# Patient Record
Sex: FEMALE | Race: WHITE | NOT HISPANIC OR LATINO | URBAN - METROPOLITAN AREA
[De-identification: names, ages, dates, MRNs, and addresses within clinical notes are randomized per-mention and may not be internally consistent; named-entity substitution may affect disease eponyms.]

---

## 2017-10-11 ENCOUNTER — OFFICE (OUTPATIENT)
Dept: URBAN - METROPOLITAN AREA CLINIC 33 | Facility: CLINIC | Age: 72
End: 2017-10-11
Payer: OTHER GOVERNMENT

## 2017-10-11 VITALS
SYSTOLIC BLOOD PRESSURE: 135 MMHG | DIASTOLIC BLOOD PRESSURE: 91 MMHG | HEIGHT: 61 IN | TEMPERATURE: 97.7 F | WEIGHT: 138 LBS | HEART RATE: 81 BPM

## 2017-10-11 DIAGNOSIS — Z86.010 PERSONAL HISTORY OF COLONIC POLYPS: ICD-10-CM

## 2017-10-11 DIAGNOSIS — R14.0 ABDOMINAL DISTENSION (GASEOUS): ICD-10-CM

## 2017-10-11 DIAGNOSIS — R14.1 GAS PAIN: ICD-10-CM

## 2017-10-11 DIAGNOSIS — K21.9 GASTRO-ESOPHAGEAL REFLUX DISEASE WITHOUT ESOPHAGITIS: ICD-10-CM

## 2017-10-11 PROCEDURE — 99204 OFFICE O/P NEW MOD 45 MIN: CPT

## 2017-11-28 ENCOUNTER — ON CAMPUS - OUTPATIENT (OUTPATIENT)
Dept: URBAN - METROPOLITAN AREA HOSPITAL 14 | Facility: HOSPITAL | Age: 72
End: 2017-11-28
Payer: MEDICARE

## 2017-11-28 DIAGNOSIS — Z86.010 PERSONAL HISTORY OF COLONIC POLYPS: ICD-10-CM

## 2017-11-28 PROCEDURE — G0105 COLORECTAL SCRN; HI RISK IND: HCPCS

## 2019-02-06 ENCOUNTER — OFFICE (OUTPATIENT)
Dept: URBAN - METROPOLITAN AREA CLINIC 33 | Facility: CLINIC | Age: 74
End: 2019-02-06
Payer: OTHER GOVERNMENT

## 2019-02-06 VITALS
HEART RATE: 69 BPM | SYSTOLIC BLOOD PRESSURE: 127 MMHG | DIASTOLIC BLOOD PRESSURE: 85 MMHG | TEMPERATURE: 97.9 F | HEIGHT: 61 IN | WEIGHT: 119 LBS

## 2019-02-06 DIAGNOSIS — K29.60 OTHER GASTRITIS WITHOUT BLEEDING: ICD-10-CM

## 2019-02-06 DIAGNOSIS — Z86.010 PERSONAL HISTORY OF COLONIC POLYPS: ICD-10-CM

## 2019-02-06 DIAGNOSIS — K21.9 GASTRO-ESOPHAGEAL REFLUX DISEASE WITHOUT ESOPHAGITIS: ICD-10-CM

## 2019-02-06 DIAGNOSIS — K76.89 OTHER SPECIFIED DISEASES OF LIVER: ICD-10-CM

## 2019-02-06 DIAGNOSIS — R10.13 EPIGASTRIC PAIN: ICD-10-CM

## 2019-02-06 PROCEDURE — 99214 OFFICE O/P EST MOD 30 MIN: CPT

## 2019-02-06 NOTE — SERVICEHPINOTES
JASON PEREZ   is a   73  female who complains of gastritis and liver cysts. She mentions onset of epigastric pain x 1 month ago, occurs mainly 2-3 hours s/p meals, and associated with mild nausea as well as heartburn (no vomiting). She was recently seen by PCP for recurrent gastritis symptoms (epigastic pain) who rx'ed Carafate TID being used for past month and advised to restart Famotidine BID which she has not done due to concern about the medication. She notes in the past using Prilosec that helped but stopped once she felt better. Last EGD in 12/2010 found gastritis with neg biopsies. She notes "night sweats" that occur few times per month going on for several months (advised to f/u with PCP).  Reviewed recent 02/02/2019 CT abdominal scan ordered by PCP for surveillance of known liver cysts that states multiple hepatic cysts unchanged compared to prior CT scans. She mentions  passed away June 2018 and feeling very stressed. Rare NSAID use. Denies n/v, regurgitation, reflux, dysphagia, odynophagia, diarrhea, constipation, melena, blood in stool, rectal bleeding, weight loss.     BR

## 2019-03-13 ENCOUNTER — ON CAMPUS - OUTPATIENT (OUTPATIENT)
Dept: URBAN - METROPOLITAN AREA HOSPITAL 14 | Facility: HOSPITAL | Age: 74
End: 2019-03-13
Payer: OTHER GOVERNMENT

## 2019-03-13 DIAGNOSIS — R10.84 GENERALIZED ABDOMINAL PAIN: ICD-10-CM

## 2019-03-13 DIAGNOSIS — K29.60 OTHER GASTRITIS WITHOUT BLEEDING: ICD-10-CM

## 2019-03-13 PROCEDURE — 43239 EGD BIOPSY SINGLE/MULTIPLE: CPT

## 2019-04-17 ENCOUNTER — OFFICE (OUTPATIENT)
Dept: URBAN - METROPOLITAN AREA CLINIC 33 | Facility: CLINIC | Age: 74
End: 2019-04-17
Payer: OTHER GOVERNMENT

## 2019-04-17 VITALS
WEIGHT: 123 LBS | TEMPERATURE: 97.9 F | SYSTOLIC BLOOD PRESSURE: 99 MMHG | HEIGHT: 61 IN | DIASTOLIC BLOOD PRESSURE: 65 MMHG | HEART RATE: 78 BPM

## 2019-04-17 DIAGNOSIS — K76.89 OTHER SPECIFIED DISEASES OF LIVER: ICD-10-CM

## 2019-04-17 DIAGNOSIS — K21.9 GASTRO-ESOPHAGEAL REFLUX DISEASE WITHOUT ESOPHAGITIS: ICD-10-CM

## 2019-04-17 DIAGNOSIS — Z86.010 PERSONAL HISTORY OF COLONIC POLYPS: ICD-10-CM

## 2019-04-17 PROCEDURE — 99214 OFFICE O/P EST MOD 30 MIN: CPT

## 2019-04-17 NOTE — SERVICEHPINOTES
JASON PEREZ   is a   73  female who is here for f/u visit concerning GERD. She has been taking Carafate less often recently due to going out and not always taking the rx with her, so she has been using it once daily or every other day and adding Pepcid (Famotidine) 20 mg prn. She has tried Prilosec in the past that helped but does not want to use it long term due to potential side effects as well as concern that she will become "dependent" on the PPI. She rather continuing with Pepcid (Famotidine) 20 mg and Tums prn that helps control her occasional reflux. She mentions using Pepcid qd x 6 months as advised by PCP that helped but when she stops it without tapering off, then she will have rebound reflux. She states when upper abdominal pains return she will take Carafate for a few weeks then stops it. Her recent EGD in 03/2019 found moderate chronic active gastritis bx neg h pylori. Her 01/2019 abdominal U/S ordered by PCP for surveillance of known liver cysts states multiple hepatic cysts unchanged compared to prior CT scans. She mentions  passed away June 2018 and feeling very stressed. Rare NSAID use. Denies n/v, regurgitation, reflux, dysphagia, odynophagia, diarrhea, constipation, melena, blood in stool, rectal bleeding, weight loss.BR

## 2020-01-22 ENCOUNTER — OFFICE (OUTPATIENT)
Dept: URBAN - METROPOLITAN AREA CLINIC 33 | Facility: CLINIC | Age: 75
End: 2020-01-22
Payer: OTHER GOVERNMENT

## 2020-01-22 VITALS
DIASTOLIC BLOOD PRESSURE: 84 MMHG | HEIGHT: 61 IN | WEIGHT: 122.4 LBS | SYSTOLIC BLOOD PRESSURE: 135 MMHG | HEART RATE: 70 BPM | TEMPERATURE: 97.5 F

## 2020-01-22 DIAGNOSIS — K59.09 OTHER CONSTIPATION: ICD-10-CM

## 2020-01-22 DIAGNOSIS — R14.1 GAS PAIN: ICD-10-CM

## 2020-01-22 DIAGNOSIS — K76.89 OTHER SPECIFIED DISEASES OF LIVER: ICD-10-CM

## 2020-01-22 DIAGNOSIS — K21.9 GASTRO-ESOPHAGEAL REFLUX DISEASE WITHOUT ESOPHAGITIS: ICD-10-CM

## 2020-01-22 DIAGNOSIS — Z86.010 PERSONAL HISTORY OF COLONIC POLYPS: ICD-10-CM

## 2020-01-22 PROCEDURE — 99214 OFFICE O/P EST MOD 30 MIN: CPT

## 2020-01-22 RX ORDER — OMEPRAZOLE AND SODIUM BICARBONATE 40; 1100 MG/1; MG/1
CAPSULE ORAL
Qty: 30 | Refills: 0 | Status: COMPLETED
Start: 2020-01-22 | End: 2020-03-04

## 2020-01-22 NOTE — SERVICEHPINOTES
JASON PEREZ   is a   73 yo white female who complains of reflux symptoms. She states mild reflux and vague upper abdominal discomfort noticed on an empty stomach, described as "spasm" sensation, lasting few minutes, and improves with Tums/Pepto-Bismol prn: No recent H2RB or PPI use. Prior use of intermittent Pepcid 20 mg helps improve her reflux, but it causes headaches. Recent EGD in 03/2019 found moderate chronic active gastritis bx neg h pylori. No longer using Carafate. She has tried Prilosec in the past that helped but does not want to use it long term due to potential side effects as well as concern that she will become "dependent" on the PPI. Rare NSAID use. She also notes mild, constipation that has improved on probiotics that helps produce daily BMs, BSS type 3 to 4: No blood/mucous in stools. She takes Gas-X for "gas pains" that are infrequent. Up to date to colonoscopy: 11/2017 with [facility] in 5 yrs. Denies n/v, regurgitation, dysphagia, odynophagia, diarrhea, melena, blood in stool, rectal bleeding.Concerning liver cysts. Most recent 01/2020 RUQ u/s for surveillance of liver cysts showed no changes. BRShe notes "weight loss" of 14 lbs over past 2-3 months. BRShe sister recently passed away in 12/2019 at age 70 yo due to CRC stage III.BRShe mentions  passed away in June 2018 and feeling depressed. BR

## 2020-03-04 ENCOUNTER — OFFICE (OUTPATIENT)
Dept: URBAN - METROPOLITAN AREA CLINIC 34 | Facility: CLINIC | Age: 75
End: 2020-03-04
Payer: OTHER GOVERNMENT

## 2020-03-04 VITALS
HEIGHT: 61 IN | HEART RATE: 73 BPM | WEIGHT: 123 LBS | TEMPERATURE: 97.5 F | SYSTOLIC BLOOD PRESSURE: 139 MMHG | DIASTOLIC BLOOD PRESSURE: 83 MMHG

## 2020-03-04 DIAGNOSIS — K21.9 GASTRO-ESOPHAGEAL REFLUX DISEASE WITHOUT ESOPHAGITIS: ICD-10-CM

## 2020-03-04 PROCEDURE — 99214 OFFICE O/P EST MOD 30 MIN: CPT | Performed by: PHYSICIAN ASSISTANT

## 2020-03-04 RX ORDER — OMEPRAZOLE 20 MG/1
CAPSULE, DELAYED RELEASE ORAL
Qty: 90 | Refills: 3 | Status: COMPLETED
Start: 2020-03-04 | End: 2022-11-16

## 2020-03-04 NOTE — SERVICEHPINOTES
JASON PEREZ   is a   75 yo   female who is here concerning h/o GERD. She was last seen in 01/22/2020 at which time trial of rx Zegerid 40 mg qd. She informs that she brought OTC Zegerid 20 mg instead that was used qd 30 to 60 min prior to breakfast x 14 days, which resolved all her symptoms. She notes resolution of symptoms on the 14 day trial of this Zegerid 20 mg qd and benefit of being symptom free for an additional 5 days after completing the 2 week use of PPI. She is here asking for the generic of Zegerid which is Omeprazole. Her last EGD in 03/2019 found evidence of moderate chronic active gastritis (bx neg h pylori). Up to date on colonoscopy: 11/2017 with [facility] in 5 yrs. Denies n/v, regurgitation, dysphagia, odynophagia, diarrhea, melena, blood in stool, rectal bleeding, weight loss. Concerning liver cysts. Most recent 01/2020 RUQ u/s for surveillance of liver cysts showed no changes. BRShe sister recently passed away in 12/2019 at age 68 yo due to CRC stage III.BRShe mentions  passed away in June 2018 and feeling depressed.

## 2020-12-02 ENCOUNTER — OFFICE (OUTPATIENT)
Dept: URBAN - METROPOLITAN AREA CLINIC 34 | Facility: CLINIC | Age: 75
End: 2020-12-02
Payer: OTHER GOVERNMENT

## 2020-12-02 VITALS
WEIGHT: 123 LBS | HEIGHT: 61 IN | TEMPERATURE: 98.2 F | DIASTOLIC BLOOD PRESSURE: 93 MMHG | HEART RATE: 67 BPM | SYSTOLIC BLOOD PRESSURE: 153 MMHG

## 2020-12-02 DIAGNOSIS — K59.09 OTHER CONSTIPATION: ICD-10-CM

## 2020-12-02 DIAGNOSIS — K21.9 GASTRO-ESOPHAGEAL REFLUX DISEASE WITHOUT ESOPHAGITIS: ICD-10-CM

## 2020-12-02 DIAGNOSIS — R14.1 GAS PAIN: ICD-10-CM

## 2020-12-02 DIAGNOSIS — K76.89 OTHER SPECIFIED DISEASES OF LIVER: ICD-10-CM

## 2020-12-02 DIAGNOSIS — Z86.010 PERSONAL HISTORY OF COLONIC POLYPS: ICD-10-CM

## 2020-12-02 PROCEDURE — 99214 OFFICE O/P EST MOD 30 MIN: CPT | Performed by: PHYSICIAN ASSISTANT

## 2020-12-02 RX ORDER — RESORCINOL/BALSAM/BISMUTH/ZINC
SUPPOSITORY, RECTAL RECTAL
Qty: 180 | Refills: 6 | Status: COMPLETED
Start: 2020-12-02 | End: 2022-11-16

## 2020-12-02 RX ORDER — PANTOPRAZOLE 20 MG/1
40 TABLET, DELAYED RELEASE ORAL
Qty: 180 | Refills: 3 | Status: ACTIVE
Start: 2020-12-02

## 2020-12-02 NOTE — SERVICEHPINOTES
JASON PEREZ   is a   76 yo white female who is here for f/u GERD visit. She was last seen in 03/2020 for GERD with rx refill of Omeprazole 20 mg qd that was providing well control of her symptoms at that time. She informs of uncontrolled EGD since October that has been manifesting with episodes of daily heartburn. She stopped the Omeprazole x 6 months ago on her own and subsequent return of epigastric pain and upper GI symptoms. She was seen by PCP in November who checked blood work and abdominal u/s at [facility], which was normal per patient report (requesting records). She then restarted a PPI Pantoprazole 40 mg qd x 4 weeks ago that has been effective. Her last EGD in 03/2019 found evidence of moderate chronic active gastritis (bx neg h pylori). She also has chronic constipation since childhood manifesting with BMs qod, BSS type 2 to 4. She has used Miralax prn that helps. Up to date on colonoscopy: 11/2017 with [facility] in 5 yrs. Denies n/v, regurgitation, dysphagia, odynophagia, change in bowel habits, diarrhea, melena, blood in stool, rectal bleeding, weight loss. Concerning liver cysts, last 01/2020 RUQ u/s for surveillance of liver cysts showed no changes.    BR

## 2021-02-10 ENCOUNTER — OFFICE (OUTPATIENT)
Dept: URBAN - METROPOLITAN AREA CLINIC 34 | Facility: CLINIC | Age: 76
End: 2021-02-10
Payer: OTHER GOVERNMENT

## 2021-02-10 VITALS
SYSTOLIC BLOOD PRESSURE: 152 MMHG | DIASTOLIC BLOOD PRESSURE: 94 MMHG | HEIGHT: 61 IN | TEMPERATURE: 97.3 F | WEIGHT: 120 LBS | HEART RATE: 77 BPM

## 2021-02-10 DIAGNOSIS — K76.1 CHRONIC PASSIVE CONGESTION OF LIVER: ICD-10-CM

## 2021-02-10 DIAGNOSIS — K21.9 GASTRO-ESOPHAGEAL REFLUX DISEASE WITHOUT ESOPHAGITIS: ICD-10-CM

## 2021-02-10 DIAGNOSIS — K58.9 IRRITABLE BOWEL SYNDROME WITHOUT DIARRHEA: ICD-10-CM

## 2021-02-10 DIAGNOSIS — K59.09 OTHER CONSTIPATION: ICD-10-CM

## 2021-02-10 DIAGNOSIS — R14.1 GAS PAIN: ICD-10-CM

## 2021-02-10 PROCEDURE — 99214 OFFICE O/P EST MOD 30 MIN: CPT | Performed by: PHYSICIAN ASSISTANT

## 2021-02-10 RX ORDER — LINACLOTIDE 72 UG/1
CAPSULE, GELATIN COATED ORAL
Qty: 90 | Refills: 3 | Status: COMPLETED
Start: 2021-02-10 | End: 2022-11-16

## 2021-02-10 NOTE — SERVICEHPINOTES
JASON PEREZ   is a   74 yo female who complains of GERD and constipation. She reports GERD is well controlled on Pantoprazole 20 mg BID but used once a day a times. She notes constipation associated with left sided abdominal "cramping" that occurs in bouts and resolved with defecation. She mentions h/o chronic constipation since childhood manifesting with BMs qod, BSS type 2 to 4. She has used Miralax prn and Colace that helps. Up to date on colonoscopy: 11/2017 with [facility] in 5 yrs. Denies n/v, regurgitation, dysphagia, odynophagia, change in bowel habits, diarrhea, melena, blood in stool, rectal bleeding, weight loss. Concerning liver cysts, last 01/2020 RUQ u/s for surveillance of liver cysts showed no changes.

## 2022-11-16 ENCOUNTER — OFFICE (OUTPATIENT)
Dept: URBAN - METROPOLITAN AREA CLINIC 34 | Facility: CLINIC | Age: 77
End: 2022-11-16
Payer: OTHER GOVERNMENT

## 2022-11-16 VITALS
WEIGHT: 135 LBS | HEART RATE: 71 BPM | SYSTOLIC BLOOD PRESSURE: 132 MMHG | DIASTOLIC BLOOD PRESSURE: 82 MMHG | TEMPERATURE: 96.6 F | HEIGHT: 61 IN

## 2022-11-16 DIAGNOSIS — Z86.010 PERSONAL HISTORY OF COLONIC POLYPS: ICD-10-CM

## 2022-11-16 DIAGNOSIS — K21.9 GASTRO-ESOPHAGEAL REFLUX DISEASE WITHOUT ESOPHAGITIS: ICD-10-CM

## 2022-11-16 PROCEDURE — 99214 OFFICE O/P EST MOD 30 MIN: CPT | Performed by: PHYSICIAN ASSISTANT

## 2022-11-16 RX ORDER — PANTOPRAZOLE SODIUM 20 MG/1
40 TABLET, DELAYED RELEASE ORAL
Qty: 180 | Refills: 3 | Status: ACTIVE
Start: 2022-11-16

## 2022-11-16 NOTE — SERVICEHPINOTES
JASON PEREZ   is a   76 yo white   female who is here for f/u GERD visit and rx refill PPI. She reports GERD is well controlled on Pantoprazole 20 mg BID but used once a day more than twice a day. She mentions mild constipation that responds well to 1/2 cup of prune juice, 2-3x/week. No longer on rx Linzess that caused diarrhea. She notes flatulence is an ongoing concern that is managed on Gas-X prn. She plans to start a new digestive enzyme. Up to date on colonoscopy: 11/2017 with [facility] in 5 yrs. Denies chest pain, n/v, regurgitation, dysphagia, odynophagia, diarrhea, melena, blood in stool, rectal bleeding, weight loss. Concerning liver cysts, prior 01/2020 RUQ u/s for surveillance of liver cysts showed no changes and 10/2022 [facility] CT scan noting stable liver cysts. br
br

## 2023-08-30 ENCOUNTER — OFFICE (OUTPATIENT)
Dept: URBAN - METROPOLITAN AREA CLINIC 34 | Facility: CLINIC | Age: 78
End: 2023-08-30

## 2023-08-30 PROCEDURE — 00031: CPT | Performed by: INTERNAL MEDICINE

## 2023-09-27 ENCOUNTER — ON CAMPUS - OUTPATIENT (OUTPATIENT)
Dept: URBAN - METROPOLITAN AREA HOSPITAL 16 | Facility: HOSPITAL | Age: 78
End: 2023-09-27
Payer: OTHER GOVERNMENT

## 2023-09-27 DIAGNOSIS — Z09 ENCOUNTER FOR FOLLOW-UP EXAMINATION AFTER COMPLETED TREATMEN: ICD-10-CM

## 2023-09-27 DIAGNOSIS — K64.9 UNSPECIFIED HEMORRHOIDS: ICD-10-CM

## 2023-09-27 DIAGNOSIS — Z86.010 PERSONAL HISTORY OF COLONIC POLYPS: ICD-10-CM

## 2023-09-27 DIAGNOSIS — K57.30 DIVERTICULOSIS OF LARGE INTESTINE WITHOUT PERFORATION OR ABS: ICD-10-CM

## 2023-09-27 DIAGNOSIS — D12.3 BENIGN NEOPLASM OF TRANSVERSE COLON: ICD-10-CM

## 2023-09-27 PROCEDURE — 45385 COLONOSCOPY W/LESION REMOVAL: CPT | Performed by: INTERNAL MEDICINE
